# Patient Record
Sex: MALE | Race: BLACK OR AFRICAN AMERICAN | NOT HISPANIC OR LATINO | URBAN - METROPOLITAN AREA
[De-identification: names, ages, dates, MRNs, and addresses within clinical notes are randomized per-mention and may not be internally consistent; named-entity substitution may affect disease eponyms.]

---

## 2018-11-19 ENCOUNTER — EMERGENCY (EMERGENCY)
Facility: HOSPITAL | Age: 33
LOS: 1 days | Discharge: HOME | End: 2018-11-19
Admitting: EMERGENCY MEDICINE
Payer: SELF-PAY

## 2018-11-19 VITALS
DIASTOLIC BLOOD PRESSURE: 86 MMHG | HEART RATE: 75 BPM | SYSTOLIC BLOOD PRESSURE: 138 MMHG | TEMPERATURE: 98 F | RESPIRATION RATE: 16 BRPM | OXYGEN SATURATION: 100 %

## 2018-11-19 PROCEDURE — 99283 EMERGENCY DEPT VISIT LOW MDM: CPT

## 2018-11-19 RX ORDER — FLUTICASONE PROPIONATE 50 MCG
1 SPRAY, SUSPENSION NASAL
Qty: 1 | Refills: 0 | OUTPATIENT
Start: 2018-11-19

## 2018-11-19 RX ORDER — CETIRIZINE HYDROCHLORIDE 10 MG/1
1 TABLET ORAL
Qty: 30 | Refills: 0 | OUTPATIENT
Start: 2018-11-19 | End: 2018-12-18

## 2018-11-19 NOTE — ED ADULT TRIAGE NOTE - CHIEF COMPLAINT QUOTE
Pt. c/o b/l eye swelling with tearing and redness x 1 week. Worse to left eye at this time. States he woke up eye all crusted. Denies fever.

## 2018-11-19 NOTE — ED PROVIDER NOTE - OBJECTIVE STATEMENT
32 y/o male no PMH presents to ED c/o bl eye redness tearing x 6 months. Pt. states over the past 6 months he has been experiencing worsneing bl eye redness tearing and itching (L>R) states left eye always mildly red and tearing but recently right as well and both are getting worse - states at times his the mucosa/lining over his eye swells/bulges and feels/looks like jelly over his eye. States has tried benadryl which he states helps with the redness but not with the the swelling. stateswelling was worse today but has since gone down. also c/o chronic nasal congestion and seasonal allergies as well but has never seen an allergist. Denies fveer chills blurry vision headache nausea vomit nt sweats.

## 2018-11-19 NOTE — ED PROVIDER NOTE - MEDICAL DECISION MAKING DETAILS
34 y/o male c/o bl eye redness swelling tearing/itching  -probable chronic allergic conjunctivitis  -visine, zyrtec, flonase-  optho follow up

## 2018-11-19 NOTE — ED PROVIDER NOTE - NSFOLLOWUPINSTRUCTIONS_ED_ALL_ED_FT
REST, NO STRENUOUS ACTIVITY, DO NO RUB EYES  TAKE MEDICATIONS AS DIRECTED  **PLEASE FOLLOW UP WITH [facility] FOR FURTHER EVALUATION**  [phone]  RETURN TO ER FOR WORSENING SYMPTOMS  ALLERGIST PROVIDED

## 2019-08-08 NOTE — ED PROVIDER NOTE - PUPIL, LEFT
COPD/PN Week 3 Survey      Responses   Facility patient discharged from?  [facility]   Does the patient have one of the following disease processes/diagnoses(primary or secondary)?  COPD/Pneumonia   Was the primary reason for admission:  Pneumonia   Week 3 attempt successful?  No   Unsuccessful attempts  Attempt 1 [NO ANSWER, VM BOX FULL]          Chelsey Whitehead LPN   reactive

## 2023-03-14 NOTE — ED PROVIDER NOTE - CPE EDP NEURO NORM
Spiritual Plan of Care    Pt Name: Darrin Dee  Pt : 1965  Date: 2023    Visit Type: In person  Referral Source: Interdisciplinary team  Reason for Visit: Trigger  Visited With: Patient  Length of Visit: 15 minutes  Spiritual Care Consult Needed: Spiritual Care eval completed  Spiritual Care Visit Preference:     Taxonomy:    · Intended Effects: Meme Affirmation, Demonstrate caring and concern  · Methods: Offer spiritual/Uatsdin support  · Interventions: Active listening    Patient Affect at Time of Visit: Open to  Visit, Expressive  Patient Assessment: Questions meme, Relies on meme  Patient  Intervention: Emotional Support, Empathic Listening, Scripture    Spiritual Plan of Care: Follow up if requested     Pt identifies as [employer]alian. Pt shared having long-term drinking problem. Writer provided emotional support, empathic listening, and a Bible, per pt request. Additional  services available prn.        normal...

## 2025-07-19 ENCOUNTER — EMERGENCY (EMERGENCY)
Facility: HOSPITAL | Age: 40
LOS: 0 days | Discharge: HOME | End: 2025-07-20
Attending: EMERGENCY MEDICINE
Payer: COMMERCIAL

## 2025-07-19 VITALS
WEIGHT: 173.28 LBS | RESPIRATION RATE: 17 BRPM | TEMPERATURE: 99 F | HEART RATE: 73 BPM | DIASTOLIC BLOOD PRESSURE: 73 MMHG | OXYGEN SATURATION: 100 % | SYSTOLIC BLOOD PRESSURE: 113 MMHG

## 2025-07-19 DIAGNOSIS — S83.92XA SPRAIN OF UNSPECIFIED SITE OF LEFT KNEE, INITIAL ENCOUNTER: ICD-10-CM

## 2025-07-19 DIAGNOSIS — Z91.013 ALLERGY TO SEAFOOD: ICD-10-CM
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
## 2025-07-19 DIAGNOSIS — M25.562 PAIN IN LEFT KNEE: ICD-10-CM
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
## 2025-07-19 PROCEDURE — 73562 X-RAY EXAM OF KNEE 3: CPT | Mod: LT

## 2025-07-19 PROCEDURE — 99283 EMERGENCY DEPT VISIT LOW MDM: CPT | Mod: 25

## 2025-07-20 PROCEDURE — 73562 X-RAY EXAM OF KNEE 3: CPT | Mod: 26,LT

## 2025-07-22 PROBLEM — Z00.00 ENCOUNTER FOR PREVENTIVE HEALTH EXAMINATION: Status: ACTIVE | Noted: 2025-07-22

## 2025-07-23 ENCOUNTER — APPOINTMENT (OUTPATIENT)
Dept: ORTHOPEDIC SURGERY | Facility: CLINIC | Age: 40
End: 2025-07-23
Payer: COMMERCIAL

## 2025-07-23 VITALS — BODY MASS INDEX: 22.93 KG/M2 | HEIGHT: 73 IN | WEIGHT: 173 LBS

## 2025-07-23 DIAGNOSIS — Z78.9 OTHER SPECIFIED HEALTH STATUS: ICD-10-CM

## 2025-07-23 DIAGNOSIS — S82.141A DISPLACED BICONDYLAR FRACTURE OF RIGHT TIBIA, INITIAL ENCOUNTER FOR CLOSED FRACTURE: ICD-10-CM

## 2025-07-23 PROCEDURE — 99203 OFFICE O/P NEW LOW 30 MIN: CPT

## 2025-07-25 PROBLEM — Z78.9 NO PERTINENT PAST MEDICAL HISTORY: Status: RESOLVED | Noted: 2025-07-25 | Resolved: 2025-07-25

## 2025-07-25 PROBLEM — Z78.9 NON-SMOKER: Status: ACTIVE | Noted: 2025-07-25

## 2025-08-08 ENCOUNTER — APPOINTMENT (OUTPATIENT)
Dept: ORTHOPEDIC SURGERY | Facility: CLINIC | Age: 40
End: 2025-08-08

## 2025-08-08 PROCEDURE — 99212 OFFICE O/P EST SF 10 MIN: CPT | Mod: 93

## 2025-08-18 ENCOUNTER — APPOINTMENT (OUTPATIENT)
Dept: ORTHOPEDIC SURGERY | Facility: CLINIC | Age: 40
End: 2025-08-18
Payer: COMMERCIAL

## 2025-08-18 PROBLEM — S82.142A CLOSED FRACTURE OF LEFT TIBIAL PLATEAU, INITIAL ENCOUNTER: Status: ACTIVE | Noted: 2025-07-23

## 2025-08-18 PROCEDURE — 99213 OFFICE O/P EST LOW 20 MIN: CPT | Mod: 25

## 2025-08-18 PROCEDURE — 73560 X-RAY EXAM OF KNEE 1 OR 2: CPT | Mod: LT

## 2025-08-20 VITALS — HEIGHT: 73 IN

## 2025-09-03 ENCOUNTER — APPOINTMENT (OUTPATIENT)
Dept: ORTHOPEDIC SURGERY | Facility: CLINIC | Age: 40
End: 2025-09-03

## 2025-09-03 DIAGNOSIS — S82.142A DISPLACED BICONDYLAR FRACTURE OF LEFT TIBIA, INITIAL ENCOUNTER FOR CLOSED FRACTURE: ICD-10-CM

## 2025-09-03 PROCEDURE — 99213 OFFICE O/P EST LOW 20 MIN: CPT | Mod: 25

## 2025-09-03 PROCEDURE — 73560 X-RAY EXAM OF KNEE 1 OR 2: CPT | Mod: LT

## 2025-09-04 VITALS — BODY MASS INDEX: 22.93 KG/M2 | HEIGHT: 73 IN | WEIGHT: 173 LBS
